# Patient Record
Sex: MALE | Race: WHITE | NOT HISPANIC OR LATINO | Employment: FULL TIME | URBAN - METROPOLITAN AREA
[De-identification: names, ages, dates, MRNs, and addresses within clinical notes are randomized per-mention and may not be internally consistent; named-entity substitution may affect disease eponyms.]

---

## 2018-12-20 ENCOUNTER — OFFICE VISIT (OUTPATIENT)
Dept: URGENT CARE | Facility: CLINIC | Age: 28
End: 2018-12-20
Payer: COMMERCIAL

## 2018-12-20 VITALS
SYSTOLIC BLOOD PRESSURE: 112 MMHG | BODY MASS INDEX: 36.18 KG/M2 | HEIGHT: 73 IN | WEIGHT: 273 LBS | OXYGEN SATURATION: 93 % | TEMPERATURE: 98.7 F | RESPIRATION RATE: 16 BRPM | DIASTOLIC BLOOD PRESSURE: 80 MMHG | HEART RATE: 102 BPM

## 2018-12-20 DIAGNOSIS — B34.9 ACUTE VIRAL SYNDROME: ICD-10-CM

## 2018-12-20 DIAGNOSIS — R50.9 FEVER, UNSPECIFIED FEVER CAUSE: ICD-10-CM

## 2018-12-20 LAB
FLUAV+FLUBV AG SPEC QL IA: NEGATIVE
INT CON NEG: NORMAL
INT CON POS: NORMAL

## 2018-12-20 PROCEDURE — 87804 INFLUENZA ASSAY W/OPTIC: CPT | Performed by: NURSE PRACTITIONER

## 2018-12-20 PROCEDURE — 99214 OFFICE O/P EST MOD 30 MIN: CPT | Performed by: NURSE PRACTITIONER

## 2018-12-20 RX ORDER — AZITHROMYCIN 250 MG/1
TABLET, FILM COATED ORAL
Qty: 6 TAB | Refills: 0 | Status: SHIPPED | OUTPATIENT
Start: 2018-12-20 | End: 2021-09-13

## 2018-12-20 ASSESSMENT — ENCOUNTER SYMPTOMS
ORTHOPNEA: 0
SHORTNESS OF BREATH: 0
WHEEZING: 0
EYE DISCHARGE: 0
FEVER: 1
HEADACHES: 1
CHILLS: 1
MYALGIAS: 1
NAUSEA: 0
COUGH: 1
DIARRHEA: 0
SORE THROAT: 0

## 2018-12-20 NOTE — PROGRESS NOTES
"Subjective:      Aleksey Senior is a 28 y.o. male who presents with Cough (x 3 days, nonproductive cough, headaches, bodyaches and chills)            HPI New problem. 28 year old male with fever, chills, body aches and mild dry cough for 2 days. He denies any congestion, sore throat, nausea or diarrhea. He does have headache as well. No flu shot this year. He has been taking theraflu with last dose last night.  Patient has no known allergies.  No current outpatient prescriptions on file prior to visit.     No current facility-administered medications on file prior to visit.      Social History     Social History   • Marital status: Single     Spouse name: N/A   • Number of children: N/A   • Years of education: N/A     Occupational History   • Not on file.     Social History Main Topics   • Smoking status: Never Smoker   • Smokeless tobacco: Never Used   • Alcohol use Not on file   • Drug use: Unknown   • Sexual activity: Not on file     Other Topics Concern   • Not on file     Social History Narrative   • No narrative on file     family history is not on file.      Review of Systems   Constitutional: Positive for chills, fever and malaise/fatigue.   HENT: Negative for congestion and sore throat.    Eyes: Negative for discharge.   Respiratory: Positive for cough. Negative for shortness of breath and wheezing.    Cardiovascular: Negative for chest pain and orthopnea.   Gastrointestinal: Negative for diarrhea and nausea.   Musculoskeletal: Positive for myalgias.   Neurological: Positive for headaches.   Endo/Heme/Allergies: Negative for environmental allergies.          Objective:     /80 (BP Location: Left arm, Patient Position: Sitting, BP Cuff Size: Large adult)   Pulse (!) 102   Temp 37.1 °C (98.7 °F) (Temporal)   Resp 16   Ht 1.854 m (6' 1\")   Wt 123.8 kg (273 lb)   SpO2 93%   BMI 36.02 kg/m²      Physical Exam   Constitutional: He is oriented to person, place, and time. He appears well-developed " and well-nourished. No distress.   HENT:   Head: Normocephalic and atraumatic.   Right Ear: External ear and ear canal normal. Tympanic membrane is not injected and not perforated. No middle ear effusion.   Left Ear: External ear and ear canal normal. Tympanic membrane is not injected and not perforated.  No middle ear effusion.   Nose: Mucosal edema present.   Mouth/Throat: No oropharyngeal exudate or posterior oropharyngeal erythema.   Eyes: Conjunctivae are normal. Right eye exhibits no discharge. Left eye exhibits no discharge.   Neck: Normal range of motion. Neck supple.   Cardiovascular: Normal rate, regular rhythm and normal heart sounds.    No murmur heard.  Pulmonary/Chest: Effort normal and breath sounds normal. No respiratory distress.   Musculoskeletal: Normal range of motion.   Normal movement of all 4 extremities.   Lymphadenopathy:     He has no cervical adenopathy.        Right: No supraclavicular adenopathy present.        Left: No supraclavicular adenopathy present.   Neurological: He is alert and oriented to person, place, and time. Gait normal.   Skin: Skin is warm and dry.   Psychiatric: He has a normal mood and affect. His behavior is normal. Thought content normal.   Nursing note and vitals reviewed.              Assessment/Plan:     1. Fever, unspecified fever cause  POCT Influenza A/B    azithromycin (ZITHROMAX) 250 MG Tab   2. Acute viral syndrome       Flu negative.  Contingent antibiotic prescription given to patient to fill upon meeting criteria of guidelines discussed.   Differential diagnosis, natural history, supportive care, and indications for immediate follow-up discussed at length.

## 2021-09-13 ENCOUNTER — HOSPITAL ENCOUNTER (OUTPATIENT)
Facility: MEDICAL CENTER | Age: 31
End: 2021-09-13
Attending: NURSE PRACTITIONER
Payer: COMMERCIAL

## 2021-09-13 ENCOUNTER — OFFICE VISIT (OUTPATIENT)
Dept: URGENT CARE | Facility: CLINIC | Age: 31
End: 2021-09-13
Payer: COMMERCIAL

## 2021-09-13 VITALS
OXYGEN SATURATION: 96 % | HEART RATE: 90 BPM | TEMPERATURE: 97.9 F | DIASTOLIC BLOOD PRESSURE: 74 MMHG | RESPIRATION RATE: 16 BRPM | SYSTOLIC BLOOD PRESSURE: 130 MMHG

## 2021-09-13 DIAGNOSIS — Z20.822 SUSPECTED COVID-19 VIRUS INFECTION: ICD-10-CM

## 2021-09-13 PROCEDURE — 99212 OFFICE O/P EST SF 10 MIN: CPT | Mod: CS | Performed by: NURSE PRACTITIONER

## 2021-09-13 PROCEDURE — U0005 INFEC AGEN DETEC AMPLI PROBE: HCPCS

## 2021-09-13 PROCEDURE — U0003 INFECTIOUS AGENT DETECTION BY NUCLEIC ACID (DNA OR RNA); SEVERE ACUTE RESPIRATORY SYNDROME CORONAVIRUS 2 (SARS-COV-2) (CORONAVIRUS DISEASE [COVID-19]), AMPLIFIED PROBE TECHNIQUE, MAKING USE OF HIGH THROUGHPUT TECHNOLOGIES AS DESCRIBED BY CMS-2020-01-R: HCPCS

## 2021-09-13 ASSESSMENT — ENCOUNTER SYMPTOMS
ORTHOPNEA: 0
SHORTNESS OF BREATH: 0
EYE DISCHARGE: 0
FEVER: 0
WHEEZING: 0
HEADACHES: 0
SORE THROAT: 1
MYALGIAS: 0
CHILLS: 0
COUGH: 1
NAUSEA: 0
SPUTUM PRODUCTION: 0
DIARRHEA: 0

## 2021-09-13 NOTE — PROGRESS NOTES
Subjective     Aleksey Senior is a 31 y.o. male who presents with Fatigue (x4days, fatigue, sore throat, cough)            HPI   New problem.  Patient is a 31-year-old male who presents with a 4-day history of fatigue, sore throat, and cough.  He has associated body aches with this.  He denies nausea, diarrhea, loss of taste or smell, shortness of breath, or wheezing.  He has not been vaccinated for COVID-19 however he does state that he did have COVID-19 last year.  He states the symptoms feel similar.  He has not been taking any medications for his symptoms.  Patient has no known allergies.  Current Outpatient Medications on File Prior to Visit   Medication Sig Dispense Refill   • azithromycin (ZITHROMAX) 250 MG Tab Take 2 tabs today and then take 1 tab daily for 4 days. (Patient not taking: Reported on 9/13/2021) 6 Tab 0     No current facility-administered medications on file prior to visit.     Social History     Socioeconomic History   • Marital status: Single     Spouse name: Not on file   • Number of children: Not on file   • Years of education: Not on file   • Highest education level: Not on file   Occupational History   • Not on file   Tobacco Use   • Smoking status: Never Smoker   • Smokeless tobacco: Never Used   Vaping Use   • Vaping Use: Some days   Substance and Sexual Activity   • Alcohol use: Not on file   • Drug use: Not on file   • Sexual activity: Not on file   Other Topics Concern   • Not on file   Social History Narrative   • Not on file     Social Determinants of Health     Financial Resource Strain:    • Difficulty of Paying Living Expenses:    Food Insecurity:    • Worried About Running Out of Food in the Last Year:    • Ran Out of Food in the Last Year:    Transportation Needs:    • Lack of Transportation (Medical):    • Lack of Transportation (Non-Medical):    Physical Activity:    • Days of Exercise per Week:    • Minutes of Exercise per Session:    Stress:    • Feeling of Stress :     Social Connections:    • Frequency of Communication with Friends and Family:    • Frequency of Social Gatherings with Friends and Family:    • Attends Holiness Services:    • Active Member of Clubs or Organizations:    • Attends Club or Organization Meetings:    • Marital Status:    Intimate Partner Violence:    • Fear of Current or Ex-Partner:    • Emotionally Abused:    • Physically Abused:    • Sexually Abused:      Breast Cancer-related family history is not on file.      Review of Systems   Constitutional: Positive for malaise/fatigue. Negative for chills and fever.   HENT: Positive for congestion and sore throat.    Eyes: Negative for discharge.   Respiratory: Positive for cough. Negative for sputum production, shortness of breath and wheezing.    Cardiovascular: Negative for chest pain and orthopnea.   Gastrointestinal: Negative for diarrhea and nausea.   Musculoskeletal: Negative for myalgias.   Neurological: Negative for headaches.   Endo/Heme/Allergies: Negative for environmental allergies.              Objective     /74 (BP Location: Left arm, Patient Position: Sitting, BP Cuff Size: Large adult)   Pulse 90   Temp 36.6 °C (97.9 °F) (Temporal)   Resp 16   SpO2 96%      Physical Exam  Vitals reviewed.   Constitutional:       General: He is not in acute distress.     Appearance: He is well-developed.   HENT:      Head: Normocephalic and atraumatic.      Right Ear: Tympanic membrane, ear canal and external ear normal. No middle ear effusion. Tympanic membrane is not injected or perforated.      Left Ear: Tympanic membrane, ear canal and external ear normal.  No middle ear effusion. Tympanic membrane is not injected or perforated.      Nose: Mucosal edema present.      Mouth/Throat:      Pharynx: Posterior oropharyngeal erythema present. No oropharyngeal exudate.   Eyes:      General:         Right eye: No discharge.         Left eye: No discharge.      Conjunctiva/sclera: Conjunctivae normal.    Cardiovascular:      Rate and Rhythm: Normal rate and regular rhythm.      Heart sounds: Normal heart sounds. No murmur heard.     Pulmonary:      Effort: Pulmonary effort is normal. No respiratory distress.      Breath sounds: Normal breath sounds.   Musculoskeletal:         General: Normal range of motion.      Cervical back: Normal range of motion and neck supple.      Comments: Normal movement of all 4 extremities.   Lymphadenopathy:      Cervical: No cervical adenopathy.      Upper Body:      Right upper body: No supraclavicular adenopathy.      Left upper body: No supraclavicular adenopathy.   Skin:     General: Skin is warm and dry.   Neurological:      Mental Status: He is alert and oriented to person, place, and time.      Gait: Gait normal.   Psychiatric:         Behavior: Behavior normal.         Thought Content: Thought content normal.                             Assessment & Plan        1. Suspected COVID-19 virus infection       -Vital signs stable on this visit.  -Counseled on quarantine protocol per CDC guidelines.  -[facility].  -Strict ED precautions and follow up instructions given to patient.  -Reviewed with patient symptomatic care with otc medications as directed.  -Educational handout as well as work note (if needed) to patient at discharge.  -Discharged in stable condition from clinic today.

## 2021-09-14 DIAGNOSIS — Z20.822 SUSPECTED COVID-19 VIRUS INFECTION: ICD-10-CM

## 2021-09-14 LAB
COVID ORDER STATUS COVID19: NORMAL
SARS-COV-2 RNA RESP QL NAA+PROBE: NOTDETECTED
SPECIMEN SOURCE: NORMAL

## 2022-01-23 ENCOUNTER — HOSPITAL ENCOUNTER (OUTPATIENT)
Facility: MEDICAL CENTER | Age: 32
End: 2022-01-23
Attending: NURSE PRACTITIONER
Payer: COMMERCIAL

## 2022-01-23 ENCOUNTER — OFFICE VISIT (OUTPATIENT)
Dept: URGENT CARE | Facility: PHYSICIAN GROUP | Age: 32
End: 2022-01-23
Payer: COMMERCIAL

## 2022-01-23 VITALS
RESPIRATION RATE: 16 BRPM | HEIGHT: 73 IN | SYSTOLIC BLOOD PRESSURE: 128 MMHG | DIASTOLIC BLOOD PRESSURE: 80 MMHG | TEMPERATURE: 101.5 F | BODY MASS INDEX: 33.9 KG/M2 | WEIGHT: 255.8 LBS | OXYGEN SATURATION: 96 % | HEART RATE: 94 BPM

## 2022-01-23 DIAGNOSIS — K12.2 INFECTION OF MOUTH: ICD-10-CM

## 2022-01-23 DIAGNOSIS — R50.9 FEVER IN ADULT: ICD-10-CM

## 2022-01-23 DIAGNOSIS — Z20.822 SUSPECTED COVID-19 VIRUS INFECTION: ICD-10-CM

## 2022-01-23 DIAGNOSIS — J02.9 PHARYNGITIS, UNSPECIFIED ETIOLOGY: ICD-10-CM

## 2022-01-23 PROCEDURE — 99214 OFFICE O/P EST MOD 30 MIN: CPT | Mod: CS | Performed by: NURSE PRACTITIONER

## 2022-01-23 PROCEDURE — U0003 INFECTIOUS AGENT DETECTION BY NUCLEIC ACID (DNA OR RNA); SEVERE ACUTE RESPIRATORY SYNDROME CORONAVIRUS 2 (SARS-COV-2) (CORONAVIRUS DISEASE [COVID-19]), AMPLIFIED PROBE TECHNIQUE, MAKING USE OF HIGH THROUGHPUT TECHNOLOGIES AS DESCRIBED BY CMS-2020-01-R: HCPCS

## 2022-01-23 PROCEDURE — 87070 CULTURE OTHR SPECIMN AEROBIC: CPT

## 2022-01-23 PROCEDURE — U0005 INFEC AGEN DETEC AMPLI PROBE: HCPCS

## 2022-01-23 RX ORDER — IBUPROFEN 200 MG
800 TABLET ORAL ONCE
Status: COMPLETED | OUTPATIENT
Start: 2022-01-23 | End: 2022-01-23

## 2022-01-23 RX ORDER — CEFDINIR 300 MG/1
300 CAPSULE ORAL 2 TIMES DAILY
Qty: 20 CAPSULE | Refills: 0 | Status: SHIPPED | OUTPATIENT
Start: 2022-01-23 | End: 2022-02-02

## 2022-01-23 RX ORDER — LIDOCAINE HYDROCHLORIDE 20 MG/ML
15 SOLUTION OROPHARYNGEAL EVERY 4 HOURS PRN
Qty: 100 ML | Refills: 1 | Status: SHIPPED | OUTPATIENT
Start: 2022-01-23 | End: 2022-01-30

## 2022-01-23 RX ORDER — CHLORHEXIDINE GLUCONATE ORAL RINSE 1.2 MG/ML
15 SOLUTION DENTAL EVERY 4 HOURS PRN
Qty: 118 ML | Refills: 1 | Status: SHIPPED | OUTPATIENT
Start: 2022-01-23 | End: 2022-01-30

## 2022-01-23 RX ADMIN — Medication 800 MG: at 15:11

## 2022-01-23 ASSESSMENT — ENCOUNTER SYMPTOMS
SORE THROAT: 1
DIARRHEA: 0
VOMITING: 0
NAUSEA: 0
FEVER: 1
ABDOMINAL PAIN: 0
CHILLS: 1
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
## 2022-01-25 DIAGNOSIS — Z20.822 SUSPECTED COVID-19 VIRUS INFECTION: ICD-10-CM

## 2022-01-25 DIAGNOSIS — J02.9 PHARYNGITIS, UNSPECIFIED ETIOLOGY: ICD-10-CM

## 2022-01-25 DIAGNOSIS — K12.2 INFECTION OF MOUTH: ICD-10-CM

## 2022-01-27 LAB
BACTERIA SPEC RESP CULT: NORMAL
SIGNIFICANT IND 70042: NORMAL
SITE SITE: NORMAL
SOURCE SOURCE: NORMAL

## 2022-04-02 ENCOUNTER — APPOINTMENT (OUTPATIENT)
Dept: RADIOLOGY | Facility: MEDICAL CENTER | Age: 32
End: 2022-04-02
Attending: EMERGENCY MEDICINE
Payer: COMMERCIAL

## 2022-04-02 ENCOUNTER — HOSPITAL ENCOUNTER (EMERGENCY)
Facility: MEDICAL CENTER | Age: 32
End: 2022-04-02
Attending: EMERGENCY MEDICINE
Payer: COMMERCIAL

## 2022-04-02 VITALS
WEIGHT: 260 LBS | DIASTOLIC BLOOD PRESSURE: 97 MMHG | SYSTOLIC BLOOD PRESSURE: 154 MMHG | HEART RATE: 85 BPM | TEMPERATURE: 97 F | BODY MASS INDEX: 34.46 KG/M2 | HEIGHT: 73 IN | RESPIRATION RATE: 16 BRPM | OXYGEN SATURATION: 92 %

## 2022-04-02 DIAGNOSIS — S86.811A RUPTURE OF RIGHT PATELLAR TENDON, INITIAL ENCOUNTER: ICD-10-CM

## 2022-04-02 PROCEDURE — 700102 HCHG RX REV CODE 250 W/ 637 OVERRIDE(OP): Performed by: EMERGENCY MEDICINE

## 2022-04-02 PROCEDURE — A9270 NON-COVERED ITEM OR SERVICE: HCPCS | Performed by: EMERGENCY MEDICINE

## 2022-04-02 PROCEDURE — 99284 EMERGENCY DEPT VISIT MOD MDM: CPT

## 2022-04-02 PROCEDURE — 73564 X-RAY EXAM KNEE 4 OR MORE: CPT | Mod: RT

## 2022-04-02 RX ORDER — HYDROCODONE BITARTRATE AND ACETAMINOPHEN 5; 325 MG/1; MG/1
1 TABLET ORAL EVERY 4 HOURS PRN
Qty: 15 TABLET | Refills: 0 | Status: SHIPPED | OUTPATIENT
Start: 2022-04-02 | End: 2022-04-05

## 2022-04-02 RX ORDER — ACETAMINOPHEN 325 MG/1
650 TABLET ORAL ONCE
Status: COMPLETED | OUTPATIENT
Start: 2022-04-02 | End: 2022-04-02

## 2022-04-02 RX ORDER — IBUPROFEN 600 MG/1
600 TABLET ORAL ONCE
Status: COMPLETED | OUTPATIENT
Start: 2022-04-02 | End: 2022-04-02

## 2022-04-02 RX ADMIN — ACETAMINOPHEN 650 MG: 325 TABLET, FILM COATED ORAL at 17:52

## 2022-04-02 RX ADMIN — IBUPROFEN 600 MG: 600 TABLET ORAL at 17:52

## 2022-04-02 ASSESSMENT — PAIN DESCRIPTION - PAIN TYPE: TYPE: ACUTE PAIN

## 2022-04-03 NOTE — ED TRIAGE NOTES
Chief Complaint   Patient presents with   • Knee Pain     In right knee, states he hurt it while playing basketball. Rates pain 7/10. Noted swelling in right knee.      Pt unable to bear weight on rle. States feels his patella is dislocated. Educated on triage process. Instructed to notify staff for any worsening symptoms. Denies any recent travel. Denies exposure to known covid positive patients. Denies any respiratory symptoms.

## 2022-04-03 NOTE — ED NOTES
R knee immobilizer and crutches provided to patient. Patient provided successful return demonstration of use of equipment.

## 2022-04-03 NOTE — ED NOTES
Pt discharged, all appropriate hospital equipment removed (IV, monitor, pulse ox, etc.). Pt left unit via waling with crutches to lobby for p/u from family to vehicle for home. Personal belongings with pt when leaving unit. Pt given discharge instructions prior to leaving unit including where to  prescriptions and when to follow-up; verbalizes understanding. Pt informed to return to ED if symptoms worsen/return or altered status develop. Copy of discharge instructions signed and turned into DC basket and copy sent with pt. Pt signed copy of controlled substances sheet, f/u with ortho and PCP

## 2022-04-03 NOTE — ED NOTES
Report received from prior RN. Pt Aox4. Resp normal/unlabored. Bed side rails up/in low position. Pt updated to POC and all questions answered.     ERP at bedside.

## 2022-04-03 NOTE — ED PROVIDER NOTES
"      ED Provider Note    Scribed for Mabel Mathur M.D. by Keyon De La Torre. 4/2/2022, 5:34 PM.    Primary Care Provider: Pcp Pt States None  Means of arrival: Walk in  History obtained from: Patient  History limited by: None    CHIEF COMPLAINT  Chief Complaint   Patient presents with   • Knee Pain     In right knee, states he hurt it while playing basketball. Rates pain 7/10. Noted swelling in right knee.        HPI  Aleksey Senior is a 31 y.o. male who presents to the Emergency Department for evaluation after a right knee injury onset prior to arrival. Patient states he felt his knee cap slip as he jumped while playing basketball. He states he was able to bend his knee initially, but can no longer do so. Patient endorses associated pain, swelling, and limited range of motion to his right knee.    REVIEW OF SYSTEMS  Pertinent positives include pain, swelling, and limited range of motion to his right knee.   E    PAST MEDICAL HISTORY   None noted when reviewed    SOCIAL HISTORY  Social History     Tobacco Use   • Smoking status: Never Smoker   • Smokeless tobacco: Never Used   Vaping Use   • Vaping Use: Some days   Substance Use Topics   • Alcohol use: Yes     Comment: Rarely       Social History     Substance and Sexual Activity   Drug Use Not on file       SURGICAL HISTORY  patient denies any surgical history     CURRENT MEDICATIONS  Home Medications    None noted when reviewed         ALLERGIES  No Known Allergies    PHYSICAL EXAM  VITAL SIGNS: BP (!) 169/110   Pulse (!) 106   Temp 36.2 °C (97.1 °F) (Temporal)   Resp 17   Ht 1.854 m (6' 1\")   Wt 118 kg (260 lb)   SpO2 91%   BMI 34.30 kg/m²   Constitutional: Alert in no apparent distress. Well apearing  HENT: Normocephalic, Atraumatic, Bilateral external ears normal. Nose normal.   Eyes:  Conjunctiva normal, non-icteric.   Lungs: Non-labored respirations  Skin: Warm, Dry, No erythema, No rash.   Neurologic: Alert, Grossly non-focal.   Psychiatric: Affect " normal, Judgment normal, Mood normal, Appears appropriate and not intoxicated.   Extremities: Right knee has significant swelling and tenderness inferior to border of patella. Limited range of motion secondary to pain    RADIOLOGY  DX-KNEE COMPLETE 4+ RIGHT   Final Result      1.  There is patella lacy with lateral subluxation of the patella on the sunrise view.        The radiologist's interpretation of all radiological studies have been reviewed by me.    COURSE & MEDICAL DECISION MAKING  Pertinent Labs & Imaging studies reviewed. (See chart for details)    5:34 PM - Patient seen and examined at bedside. Patient will be treated with ibuprofen 600 mg and acetaminophen 650 mg. Ordered DX-knee complete right to evaluate his symptoms.     6:28 PM - Patient was reevaluated at bedside. Discussed radiology results with the patient and informed them that they likely tore their patellar tendon. Instructed the patient to rest, ice, and elevate his knee for the time being and informed him of plans for knee immobilization. Recommended ibuprofen and Tylenol to manage swelling and pain.       7:16 PM - I discussed the patient's case and the above findings with Dr. Langston (Ortho) who said the patient could weight-bear as tolerated while in the immobilizer. Patient was referred to ortho.    The patient was informed of their blood pressure exceeding 120/80 and I have asked them to follow up with their primary care physician to discuss further monitoring and possible treatment.      HTN/IDDM FOLLOW UP:  The patient is referred to a primary physician for blood pressure management, diabetic screening, and for all other preventive health concerns         The patient will not drink alcohol nor drive with prescribed medications. The patient will return for new or worsening symptoms and is stable at the time of discharge. Patient was given return precautions. Patient and/or family member verbalizes understanding and will  comply.    DISPOSITION:  Patient will be discharged home in stable condition.    FOLLOW UP:  Nic Langston M.D.  [address]  [address]  [place] [id number] [phone]    Schedule an appointment as soon as possible for a visit in 2 days  call Monday to make appointment. Return for worsening pain, swelling or other concerns    Primary Care      Please follow-up your blood pressure was elevated      OUTPATIENT MEDICATIONS:  Discharge Medication List as of 4/2/2022  7:26 PM      START taking these medications    Details   HYDROcodone-acetaminophen (NORCO) 5-325 MG Tab per tablet Take 1 Tablet by mouth every four hours as needed for up to 3 days., Disp-15 Tablet, R-0, Normal              FINAL IMPRESSION  1. Rupture of right patellar tendon, initial encounter             This dictation has been created using voice recognition software and/or scribes. The accuracy of the dictation is limited by the abilities of the software and the expertise of the scribes. I expect there may be some errors of grammar and possibly content. I made every attempt to manually correct the errors within my dictation. However, errors related to voice recognition software and/or scribes may still exist and should be interpreted within the appropriate context.     IKeyon (Scribe), am scribing for, and in the presence of, Mabel Mathur M.D..    Electronically signed by: Keyon De La Torre (Scribe), 4/2/2022    Mabel HANNA M.D. personally performed the services described in this documentation, as scribed by Keyon De La Torre in my presence, and it is both accurate and complete.    The note accurately reflects work and decisions made by me.  Mabel Mathur M.D.  4/2/2022  10:37 PM

## 2022-06-16 ENCOUNTER — OFFICE VISIT (OUTPATIENT)
Dept: OCCUPATIONAL MEDICINE | Facility: CLINIC | Age: 32
End: 2022-06-16

## 2022-06-16 VITALS
BODY MASS INDEX: 33.93 KG/M2 | HEART RATE: 78 BPM | OXYGEN SATURATION: 96 % | TEMPERATURE: 98.1 F | WEIGHT: 256 LBS | DIASTOLIC BLOOD PRESSURE: 76 MMHG | SYSTOLIC BLOOD PRESSURE: 124 MMHG | HEIGHT: 73 IN

## 2022-06-16 DIAGNOSIS — Z02.89 ENCOUNTER FOR FITNESS FOR DUTY EXAMINATION: ICD-10-CM

## 2022-06-16 PROCEDURE — 8915 PR COMPREHENSIVE PHYSICAL: Performed by: PREVENTIVE MEDICINE

## 2022-06-21 ENCOUNTER — OFFICE VISIT (OUTPATIENT)
Dept: OCCUPATIONAL MEDICINE | Facility: CLINIC | Age: 32
End: 2022-06-21

## 2022-06-21 ENCOUNTER — NON-PROVIDER VISIT (OUTPATIENT)
Dept: OCCUPATIONAL MEDICINE | Facility: CLINIC | Age: 32
End: 2022-06-21

## 2022-06-21 DIAGNOSIS — Z02.4 ENCOUNTER FOR COMMERCIAL DRIVER MEDICAL EXAMINATION (CDME): ICD-10-CM

## 2022-06-21 DIAGNOSIS — Z02.89 ENCOUNTER FOR OCCUPATIONAL HEALTH ASSESSMENT: ICD-10-CM

## 2022-06-21 PROCEDURE — 8907 PR URINE COLLECT ONLY: Performed by: NURSE PRACTITIONER

## 2022-06-21 PROCEDURE — 7100 PR DOT PHYSICAL: Performed by: PREVENTIVE MEDICINE
